# Patient Record
Sex: FEMALE | Race: ASIAN | NOT HISPANIC OR LATINO | Employment: FULL TIME | URBAN - METROPOLITAN AREA
[De-identification: names, ages, dates, MRNs, and addresses within clinical notes are randomized per-mention and may not be internally consistent; named-entity substitution may affect disease eponyms.]

---

## 2017-06-13 ENCOUNTER — OFFICE VISIT (OUTPATIENT)
Dept: FAMILY MEDICINE | Facility: CLINIC | Age: 32
End: 2017-06-13

## 2017-06-13 VITALS
DIASTOLIC BLOOD PRESSURE: 76 MMHG | WEIGHT: 142.4 LBS | BODY MASS INDEX: 25.23 KG/M2 | SYSTOLIC BLOOD PRESSURE: 112 MMHG | TEMPERATURE: 98.4 F | HEART RATE: 83 BPM | HEIGHT: 63 IN | OXYGEN SATURATION: 98 %

## 2017-06-13 DIAGNOSIS — K59.00 CONSTIPATION, UNSPECIFIED CONSTIPATION TYPE: Primary | ICD-10-CM

## 2017-06-13 DIAGNOSIS — G89.29 CHRONIC PAIN OF LEFT KNEE: ICD-10-CM

## 2017-06-13 DIAGNOSIS — M25.562 CHRONIC PAIN OF LEFT KNEE: ICD-10-CM

## 2017-06-13 RX ORDER — ACETAMINOPHEN 500 MG
1000 TABLET ORAL 3 TIMES DAILY PRN
Qty: 100 TABLET | Refills: 0 | Status: SHIPPED | OUTPATIENT
Start: 2017-06-13 | End: 2018-06-20

## 2017-06-13 RX ORDER — POLYETHYLENE GLYCOL 3350 17 G/17G
1 POWDER, FOR SOLUTION ORAL 2 TIMES DAILY
Qty: 510 G | Refills: 1 | Status: SHIPPED | OUTPATIENT
Start: 2017-06-13 | End: 2018-06-20

## 2017-06-13 RX ORDER — IBUPROFEN 800 MG/1
800 TABLET, FILM COATED ORAL EVERY 6 HOURS PRN
Qty: 30 TABLET | Refills: 1 | Status: SHIPPED | OUTPATIENT
Start: 2017-06-13 | End: 2018-06-20
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
## 2017-06-13 NOTE — PATIENT INSTRUCTIONS
We are prescribing you Miralax to help with your abdominal pain.    You can start taking Miralax 2  times daily, you can dissolve the Miralax in 8 oz of fluid until you start having soft easy to pass bowel movements. Then start taking the Miralax once a day.    You also need to drink 8 cups of non caffeine fluid a day    Also Dr. Marrero is sending in Tylenol to your pharmacy to help with the knee pain.    Follow up with Dr. Marrero in one  (1) week.      Referral for ( TEST )  :      Physical Therapy  LOCATION/PLACE/Provider :    [facility] [address] [address]  DATE & TIME :     6-15-17 at 11:00  PHONE :     [phone]  FAX :     [phone]  ADDITIONAL INFORMATION :       Appointment made by clinic staff/:    CYNTHIA

## 2017-06-13 NOTE — MR AVS SNAPSHOT
After Visit Summary   6/13/2017    Sandra Zhang    MRN: [id number]           Patient Information     Date Of Birth          1985        Visit Information        Provider Department      6/13/2017 2:20 PM Milagros Marrero MD [facility]        Today's Diagnoses     Constipation, unspecified constipation type    -  1    Chronic pain of left knee          Care Instructions    We are prescribing you Miralax to help with your abdominal pain.    You can start taking Miralax 2  times daily, you can dissolve the Miralax in 8 oz of fluid until you start having soft easy to pass bowel movements. Then start taking the Miralax once a day.    You also need to drink 8 cups of non caffeine fluid a day    Also Dr. Marrero is sending in Tylenol to your pharmacy to help with the knee pain.    Follow up with Dr. Marrero in one  (1) week.            Follow-ups after your visit        Additional Services     PHYSICAL THERAPY REFERRAL       PT/OT REFERRAL  Sandra Zhang  1985  Phone #: [phone] (home)    needed: No  Language: Sophia    PT/OT  Facility:   [facility], P: [phone], F: [phone] (or other appropriate close to patient and accepted by insurance)    History: chronic left knee pain x years.  Able to ambulate.  Pain over left LCL in clinic today.  No laxity.      Precautions/Contraindications: None    Imaging Studies: None    Surgical Procedure/Test Results: None    Treatment Goals:   Increase: Strength    Prognosis: good    Visits: Up to 10    Evaluate: Evaluate and treat    Plan: per PT                  Who to contact     Please call your clinic at [phone] to:    Ask questions about your health    Make or cancel appointments    Discuss your medicines    Learn about your test results    Speak to your doctor   If you have compliments or concerns about an experience at your clinic, or if you wish to file a complaint, please contact [facility]  "Physicians Patient Relations at [phone] or email us at [email or web address]         Additional Information About Your Visit        [employer] Information     [employer] is an electronic gateway that provides easy, online access to your medical records. With [employer], you can request a clinic appointment, read your test results, renew a prescription or communicate with your care team.     To sign up for [employer] visit the website at [email or web address]   You will be asked to enter the access code listed below, as well as some personal information. Please follow the directions to create your username and password.     Your access code is: [email or web address]  Expires: 2017  2:48 PM     Your access code will  in 90 days. If you need help or a new code, please contact your [facility] or call [phone] for assistance.        Care EveryWhere ID     This is your Care EveryWhere ID. This could be used by other organizations to access your [place] medical records  [id number]        Your Vitals Were     Pulse Temperature Height Pulse Oximetry BMI (Body Mass Index)       83 98.4  F (36.9  C) (Oral) 5' 3.25\" (160.7 cm) 98% 25.03 kg/m2        Blood Pressure from Last 3 Encounters:   17 112/76   14 130/89    Weight from Last 3 Encounters:   17 142 lb 6.4 oz (64.6 kg)   14 127 lb (57.6 kg)              We Performed the Following     PHYSICAL THERAPY REFERRAL          Today's Medication Changes          These changes are accurate as of: 17  2:48 PM.  If you have any questions, ask your nurse or doctor.               Start taking these medicines.        Dose/Directions    acetaminophen 500 MG tablet   Commonly known as:  TYLENOL   Used for:  Constipation, unspecified constipation type, Chronic pain of left knee   Started by:  Milagros Marrero MD        Dose:  1000 mg   Take 2 tablets (1,000 mg) by mouth 3 times daily as needed for mild " pain   Quantity:  100 tablet   Refills:  0       ibuprofen 800 MG tablet   Commonly known as:  ADVIL/MOTRIN   Used for:  Chronic pain of left knee   Started by:  Milagros Marrero MD        Dose:  800 mg   Take 1 tablet (800 mg) by mouth every 6 hours as needed for moderate pain   Quantity:  30 tablet   Refills:  1       polyethylene glycol powder   Commonly known as:  MIRALAX   Used for:  Constipation, unspecified constipation type   Started by:  Milagros Marrero MD        Dose:  1 capful   Take 17 g (1 capful) by mouth 2 times daily Until having soft easy to pass bowel movements then decrease to once daily   Quantity:  510 g   Refills:  1            Where to get your medicines      These medications were sent to [facility]/pharmacy #[id number] - [place], [place] - [address] [facility]  [address], [place] [address]    Hours:  24-hours Phone:  [phone]     acetaminophen 500 MG tablet    ibuprofen 800 MG tablet    polyethylene glycol powder                Primary Care Provider Office Phone # Fax #    Milagros Marrero [phone]       [facility] [address] [place] [address]        Thank you!     Thank you for choosing [facility]  for your care. Our goal is always to provide you with excellent care. Hearing back from our patients is one way we can continue to improve our services. Please take a few minutes to complete the written survey that you may receive in the mail after your visit with us. Thank you!             Your Updated Medication List - Protect others around you: Learn how to safely use, store and throw away your medicines at www.disposemymeds.org.          This list is accurate as of: 6/13/17  2:48 PM.  Always use your most recent med list.                   Brand Name Dispense Instructions for use    acetaminophen 500 MG tablet    TYLENOL    100 tablet    Take 2 tablets (1,000 mg) by mouth 3 times daily as needed for mild pain        ibuprofen 800 MG tablet    ADVIL/MOTRIN    30 tablet    Take 1 tablet (800 mg) by mouth every 6 hours as needed for moderate pain       polyethylene glycol powder    MIRALAX    510 g    Take 17 g (1 capful) by mouth 2 times daily Until having soft easy to pass bowel movements then decrease to once daily

## 2017-09-10 ENCOUNTER — HEALTH MAINTENANCE LETTER (OUTPATIENT)
Age: 32
End: 2017-09-10

## 2018-06-20 ENCOUNTER — AMBULATORY - HEALTHEAST (OUTPATIENT)
Dept: ADMINISTRATIVE | Facility: REHABILITATION | Age: 33
End: 2018-06-20

## 2018-06-20 ENCOUNTER — OFFICE VISIT (OUTPATIENT)
Dept: FAMILY MEDICINE | Facility: CLINIC | Age: 33
End: 2018-06-20
Payer: COMMERCIAL

## 2018-06-20 VITALS
OXYGEN SATURATION: 100 % | RESPIRATION RATE: 18 BRPM | DIASTOLIC BLOOD PRESSURE: 79 MMHG | SYSTOLIC BLOOD PRESSURE: 118 MMHG | HEIGHT: 63 IN | TEMPERATURE: 98.3 F | BODY MASS INDEX: 19.99 KG/M2 | WEIGHT: 112.8 LBS | HEART RATE: 65 BPM

## 2018-06-20 DIAGNOSIS — R07.81 RIB PAIN: ICD-10-CM

## 2018-06-20 DIAGNOSIS — F33.2 SEVERE EPISODE OF RECURRENT MAJOR DEPRESSIVE DISORDER, WITHOUT PSYCHOTIC FEATURES (H): Primary | ICD-10-CM

## 2018-06-20 DIAGNOSIS — R63.4 WEIGHT LOSS: ICD-10-CM

## 2018-06-20 DIAGNOSIS — R07.81 RIB PAIN ON RIGHT SIDE: ICD-10-CM

## 2018-06-20 LAB — TSH SERPL DL<=0.05 MIU/L-ACNC: 1.01 UIU/ML (ref 0.3–5)

## 2018-06-20 ASSESSMENT — ENCOUNTER SYMPTOMS
HEMATURIA: 0
SPUTUM PRODUCTION: 0
CLAUDICATION: 0
HEMOPTYSIS: 0
SEIZURES: 0
SINUS PAIN: 0
COUGH: 1
SHORTNESS OF BREATH: 1
FEVER: 1
ABDOMINAL PAIN: 1
DEPRESSION: 1
WEIGHT LOSS: 1
NAUSEA: 1
DIAPHORESIS: 0
BLURRED VISION: 1
BACK PAIN: 0
DOUBLE VISION: 1
FOCAL WEAKNESS: 0
SPEECH CHANGE: 0
NERVOUS/ANXIOUS: 0
CHILLS: 1
BLOOD IN STOOL: 0
TINGLING: 0
CONSTIPATION: 0
EYE REDNESS: 0
FLANK PAIN: 0
HEADACHES: 1
NECK PAIN: 0
EYE PAIN: 0
PND: 0
DIZZINESS: 0
EYE DISCHARGE: 0
TREMORS: 0
SORE THROAT: 0
WHEEZING: 0
MYALGIAS: 0
STRIDOR: 0
PALPITATIONS: 1
DYSURIA: 1
LOSS OF CONSCIOUSNESS: 0
DIARRHEA: 0
PHOTOPHOBIA: 0
SENSORY CHANGE: 0
INSOMNIA: 1
FREQUENCY: 0
VOMITING: 1
ORTHOPNEA: 1
WEAKNESS: 1
HEARTBURN: 0

## 2018-06-20 ASSESSMENT — LIFESTYLE VARIABLES: SUBSTANCE_ABUSE: 0

## 2018-06-20 NOTE — MR AVS SNAPSHOT
After Visit Summary   6/20/2018    Sandra Zhang    MRN: [id number]           Patient Information     Date Of Birth          1985        Visit Information        Provider Department      6/20/2018 11:00 AM Amol Figueroa MD [facility]        Today's Diagnoses     Severe episode of recurrent major depressive disorder, without psychotic features (H)    -  1    Weight loss        Rib pain on right side          Care Instructions    - You can take 600 ml of ibuprofen as needed every 6 hours   - Please schedule follow-up with Dr. Marrero in 2 weeks      Your medication list is printed, please keep this with you, it is helpful to bring this current list to any other medical appointments, the emergency room or hospital.    If you had lab testing today and your results are reassuring or normal they will be be mailed to you within 7 days.     If the lab tests need quick action we will call you with the results.   The phone number we will call with results is # [phone] (home) . If this is not the best number please call our clinic and change the number.    If you need any refills please call your pharmacy and they will contact us.    If you have any further concerns or wish to schedule another appointment you must call our office during normal business hours  [phone] (8-5:00 M-F)  If you have urgent medical questions that cannot wait  you may also call [phone] at any time of day.  If you have a medical emergency please call [phone].    Thank you for coming to [facility].            Follow-ups after your visit        Additional Services     PHYSICAL THERAPY REFERRAL       PT/OT  Facility:   Where patient prefers     History: Chest/rib pain x2-3 weeks, worse with inspiration     Precautions/Contraindications: None    Imaging Studies: CT    Surgical Procedure/Test Results: None    Treatment Goals:   Decrease: Pain    Prognosis: excellent    Visits: Up to 12    Evaluate: Evaluate  "and treat    Plan: Manual Therapy, Flexibility Exercise and Strength Exercise                  Future tests that were ordered for you today     Open Future Orders        Priority Expected Expires Ordered    PHYSICAL THERAPY REFERRAL Routine 7/4/2018 12/19/2018 6/20/2018            Who to contact     Please call your clinic at [phone] to:    Ask questions about your health    Make or cancel appointments    Discuss your medicines    Learn about your test results    Speak to your doctor            Additional Information About Your Visit        Care EveryWhere ID     This is your Care EveryWhere ID. This could be used by other organizations to access your [place] medical records  [id number]        Your Vitals Were     Pulse Temperature Respirations Height Last Period Pulse Oximetry    65 98.3  F (36.8  C) (Oral) 18 5' 3\" (160 cm) 05/27/2018 100%    BMI (Body Mass Index)                   19.98 kg/m2            Blood Pressure from Last 3 Encounters:   06/20/18 118/79   06/13/17 112/76   04/07/14 130/89    Weight from Last 3 Encounters:   06/20/18 112 lb 12.8 oz (51.2 kg)   06/13/17 142 lb 6.4 oz (64.6 kg)   04/07/14 127 lb (57.6 kg)              We Performed the Following     TSH  Sensitive ([facility])          Today's Medication Changes          These changes are accurate as of 6/20/18 11:50 AM.  If you have any questions, ask your nurse or doctor.               Stop taking these medicines if you haven't already. Please contact your care team if you have questions.     acetaminophen 500 MG tablet   Commonly known as:  TYLENOL   Stopped by:  Amol Figueroa MD           ibuprofen 800 MG tablet   Commonly known as:  ADVIL/MOTRIN   Stopped by:  Amol Figueroa MD           polyethylene glycol powder   Commonly known as:  MIRALAX   Stopped by:  Amol Figueroa MD                    Primary Care Provider Office Phone # Fax #    Milagros Tiffany Marrero [phone]       [facility] [address] [facility] [address]        Equal Access to Services     GABBY ESPINOSA : Hadii aad ku hadjiljoyce Holder, waines lynne, qadigna arcos, suzan gusman. So [facility] [phone].    ATENCIÓN: Si habla español, tiene a underwood disposición servicios gratuitos de asistencia lingüística. Llame al [phone].    We comply with applicable federal civil rights laws and [place] laws. We do not discriminate on the basis of race, color, national origin, age, disability, sex, sexual orientation, or gender identity.            Thank you!     Thank you for choosing [facility]  for your care. Our goal is always to provide you with excellent care. Hearing back from our patients is one way we can continue to improve our services. Please take a few minutes to complete the written survey that you may receive in the mail after your visit with us. Thank you!             Your Updated Medication List - Protect others around you: Learn how to safely use, store and throw away your medicines at www.disposemymeds.org.      Notice  As of 6/20/2018 11:50 AM    You have not been prescribed any medications.

## 2018-06-20 NOTE — PROGRESS NOTES
Assessment and Plan     1. Severe episode of recurrent major depressive disorder, without psychotic features (H)  Significant depression with recent SI. Talked about antidepressant meds at length but she is unwilling to start today. Is willing to start therapy. Warm hand-off given to Dr. Barajas who was then introduced to the patient while the plan was negotiated. Additional coordination occurred after with Dr. Barajas. Crisis pamphlet given to the patient. She is to follow up with either me or her PCP in the next 2-3 weeks and will see Dr. Barajas on Monday. If she becomes amenable, would like to start either Prozac or Zoloft.  - MENTAL HEALTH REFERRAL  -    2. Weight loss  Had a very extensive lab/XR work up in ER 2 weeks ago. Will also check TSH. Continue to monitor.  - TSH  Sensitive ([facility])    3. Rib pain on right side  Was unable to improve rib pain with manipulation. Will refer to PT. OTC Ibuprofen. If not improving, could consider seeing our DO physicians.  - PHYSICAL THERAPY REFERRAL; Future    Overall, I counseled and coordinated care for greater than 50% of the 50 minute face-to-face visit.    Options for treatment and follow-up care were reviewed with the patient and/or guardian. Sandra Zhang and/or guardian engaged in the decision making process and verbalized understanding of the options discussed and agreed with the final plan. I answered all of their questions.    Amol Figueroa III, MD, FAAFP  [facility]  06/20/18 11:13 AM           HPI:       Sandra Zhang is a 32 year old  female  Patient presents with:  Breast Pain: Starts at midsternum and goest to right breast up to the collar bone.  Noticed x3 months.  Pt. went to ER this pas Susie 3 ([facility]) Hurts to breathe, or any other active movements.  Medication Reconciliation: Completed.     Has multiple, significant complaints. Has been having suicidal thoughts off and on for 2 months. Thought of hanging herself in her  closet during her daughter's birthday party. She is a single mom. She has two of her three children (don't have details on the status of the 3rd child). A few weeks ago, she thought about driving her car into the water because she cannot swim. This is not the first time that she has had depressed mood. She was previously told by friends to avoid anti-depressants and is not interested in them today. She repeatedly admits that many of her symptoms are likely attributable to her depressed mood. Denies anxious feelings. It got worse a few weeks ago for a reason that she won't disclose in front of her children. Has SOB with deep breathing. Drinks one day per week. Denies other substance use.    Has been having breast/chest pain that starts in the front and wraps around to the back for 2-3 weeks. Gets worse with movement and deep breathing. Denies trauma.         PMHX:     There is no problem list on file for this patient.      No current outpatient prescriptions on file.       Social History     Social History     Marital status: Single     Spouse name: N/A     Number of children: N/A     Years of education: N/A     Occupational History     Not on file.     Social History Main Topics     Smoking status: Current Every Day Smoker     Smokeless tobacco: Current User      Comment: 2 cig/day     Alcohol use No     Drug use: No     Sexual activity: Not on file     Other Topics Concern     Not on file     Social History Narrative       No Known Allergies    No results found for this or any previous visit (from the past 24 hour(s)).         Review of Systems:     Review of Systems   Constitutional: Positive for chills, fever, malaise/fatigue and weight loss. Negative for diaphoresis.   HENT: Negative for congestion, ear discharge, ear pain, hearing loss, nosebleeds, sinus pain, sore throat and tinnitus.    Eyes: Positive for blurred vision and double vision. Negative for photophobia, pain, discharge and redness.   Respiratory:  "Positive for cough and shortness of breath. Negative for hemoptysis, sputum production, wheezing and stridor.    Cardiovascular: Positive for chest pain, palpitations and orthopnea. Negative for claudication, leg swelling and PND.   Gastrointestinal: Positive for abdominal pain (chronic, unchanged), nausea and vomiting. Negative for blood in stool, constipation, diarrhea, heartburn and melena.   Genitourinary: Positive for dysuria (chronic, x 2-3 years). Negative for flank pain, frequency, hematuria and urgency.   Musculoskeletal: Negative for back pain, myalgias and neck pain.   Skin: Positive for rash. Negative for itching.   Neurological: Positive for weakness and headaches. Negative for dizziness, tingling, tremors, sensory change, speech change, focal weakness, seizures and loss of consciousness.   Psychiatric/Behavioral: Positive for depression and suicidal ideas. Negative for substance abuse. The patient has insomnia. The patient is not nervous/anxious.             Physical Exam:     Vitals:    06/20/18 1056   BP: 118/79   Pulse: 65   Resp: 18   Temp: 98.3  F (36.8  C)   TempSrc: Oral   SpO2: 100%   Weight: 112 lb 12.8 oz (51.2 kg)   Height: 5' 3\" (160 cm)     Body mass index is 19.98 kg/(m^2).    Physical Exam   Constitutional: She is oriented to person, place, and time and well-developed, well-nourished, and in no distress. She appears to not be writhing in pain.  Non-toxic appearance. She does not have a sickly appearance.   Pulmonary/Chest: Breath sounds normal. No accessory muscle usage. No respiratory distress.         Chest wall is not dull to percussion.   Neurological: She is alert and oriented to person, place, and time. Gait normal. GCS score is 15.   Psychiatric: Memory normal. Her mood appears not anxious. Her affect is labile (easily cries). Her affect is not inappropriate. She is not agitated. She exhibits a depressed mood. She expresses suicidal ideation. She expresses no homicidal ideation. " She expresses no suicidal plans and no homicidal plans. She is apathetic. She exhibits ordered thought content. She does not have a flat affect.   Easily comes to tears. Has difficulty committing to a treatment plan.  Insight: good  Judgement: so-so   Nursing note and vitals reviewed.

## 2018-06-20 NOTE — PATIENT INSTRUCTIONS
- You can take 600 ml of ibuprofen as needed every 6 hours   - Please schedule follow-up with Dr. Marrero in 2 weeks      Your medication list is printed, please keep this with you, it is helpful to bring this current list to any other medical appointments, the emergency room or hospital.    If you had lab testing today and your results are reassuring or normal they will be be mailed to you within 7 days.     If the lab tests need quick action we will call you with the results.   The phone number we will call with results is # [phone] (home) . If this is not the best number please call our clinic and change the number.    If you need any refills please call your pharmacy and they will contact us.    If you have any further concerns or wish to schedule another appointment you must call our office during normal business hours  [phone] (8-5:00 M-F)  If you have urgent medical questions that cannot wait  you may also call [phone] at any time of day.  If you have a medical emergency please call [phone].    Thank you for coming to [facility].      Referral for ( TEST )  :      Physical Therapy  LOCATION/PLACE/Provider :    [facility]   DATE & TIME :     7-2-2018 at 7:00am  PHONE :     [phone]  FAX :     [phone]  Appointment made by clinic staff/:    Mariely
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
## 2021-03-11 ENCOUNTER — TELEPHONE (OUTPATIENT)
Dept: FAMILY MEDICINE | Facility: CLINIC | Age: 36
End: 2021-03-11

## 2021-03-11 NOTE — TELEPHONE ENCOUNTER
Sandra Zhang called to schedule an appointment for TB screening.        TB Screening questions  1. Have you had recent contact with a person with active tuberculosis (TB)?  No, continue to next question.  2. Have you ever been treated for tuberculosis (TB) or latent TB before?  No, continue to next question.  3. Has a county worker or another healthcare worker (not your employer) told you to come in to be tested for TB?  No, continue to next question.  4. Have you had a live vaccine (smallpox, flumist, MMR, varicella, oral polio and/or yellow fever) in the last 4 weeks?  No, lab visit scheduled.       Lab visit scheduled for 03/12/2021.    Josemanuel Salter

## 2021-03-12 DIAGNOSIS — Z11.1 SCREENING EXAMINATION FOR PULMONARY TUBERCULOSIS: ICD-10-CM

## 2021-03-12 DIAGNOSIS — Z11.1 SCREENING EXAMINATION FOR PULMONARY TUBERCULOSIS: Primary | ICD-10-CM

## 2021-03-12 PROCEDURE — 36415 COLL VENOUS BLD VENIPUNCTURE: CPT

## 2021-03-12 NOTE — PROGRESS NOTES
Pt came in needing TB test for work. Patient verbally gave permission to please fax  Test results to Julee Braron at  when they are resulted

## 2021-03-17 LAB
QTF ANTIGEN TB1-NIL: -0.01 IU/ML
QTF ANTIGEN TB2-NIL: 0 IU/ML
QTF INTERPRETATION: NORMAL
QTF MITOGEN - NIL: 7.62 IU/ML
QTF NIL: 0.08 IU/ML
QTF RESULT: NEGATIVE

## 2021-03-19 ENCOUNTER — TELEPHONE (OUTPATIENT)
Dept: FAMILY MEDICINE | Facility: CLINIC | Age: 36
End: 2021-03-19

## 2021-03-19 NOTE — TELEPHONE ENCOUNTER
Normal results from 03/12/2021 given to patient. Patient calling to get TB result, told her negative results and verified with J[facility] Medical Assistant. Patient asked for result to be print out she will  today, results printed and left at the  drawer for .     March 19, 2021 at 4:07 PM by Josemanuel Salter  [facility]  [phone]

## 2021-05-30 ENCOUNTER — RECORDS - HEALTHEAST (OUTPATIENT)
Dept: ADMINISTRATIVE | Facility: CLINIC | Age: 36
End: 2021-05-30

## 2022-05-09 ENCOUNTER — LAB REQUISITION (OUTPATIENT)
Dept: LAB | Facility: CLINIC | Age: 37
End: 2022-05-09

## 2022-05-09 LAB
HBV SURFACE AB SERPL IA-ACNC: 0 M[IU]/ML
HBV SURFACE AG SERPL QL IA: NONREACTIVE

## 2022-05-09 PROCEDURE — 86735 MUMPS ANTIBODY: CPT | Performed by: INTERNAL MEDICINE

## 2022-05-09 PROCEDURE — 86787 VARICELLA-ZOSTER ANTIBODY: CPT | Performed by: INTERNAL MEDICINE

## 2022-05-09 PROCEDURE — 86481 TB AG RESPONSE T-CELL SUSP: CPT | Performed by: INTERNAL MEDICINE

## 2022-05-09 PROCEDURE — 86762 RUBELLA ANTIBODY: CPT | Performed by: INTERNAL MEDICINE

## 2022-05-09 PROCEDURE — 87340 HEPATITIS B SURFACE AG IA: CPT | Performed by: INTERNAL MEDICINE

## 2022-05-09 PROCEDURE — 86706 HEP B SURFACE ANTIBODY: CPT | Performed by: INTERNAL MEDICINE

## 2022-05-09 PROCEDURE — 86765 RUBEOLA ANTIBODY: CPT | Performed by: INTERNAL MEDICINE

## 2022-05-10 LAB
MEV IGG SER IA-ACNC: 104 AU/ML
MEV IGG SER IA-ACNC: POSITIVE
MUMPS ANTIBODY IGG INSTRUMENT VALUE: 121 AU/ML
MUV IGG SER QL IA: POSITIVE
QUANTIFERON MITOGEN: 10 IU/ML
QUANTIFERON NIL TUBE: 0.05 IU/ML
QUANTIFERON TB1 TUBE: 0.05 IU/ML
QUANTIFERON TB2 TUBE: 0.06
RUBV IGG SERPL QL IA: 1.23 INDEX
RUBV IGG SERPL QL IA: POSITIVE
VZV IGG SER QL IA: 446.1 INDEX
VZV IGG SER QL IA: POSITIVE

## 2022-05-11 LAB
GAMMA INTERFERON BACKGROUND BLD IA-ACNC: 0.05 IU/ML
M TB IFN-G BLD-IMP: NEGATIVE
M TB IFN-G CD4+ BCKGRND COR BLD-ACNC: 9.95 IU/ML
MITOGEN IGNF BCKGRD COR BLD-ACNC: 0 IU/ML
MITOGEN IGNF BCKGRD COR BLD-ACNC: 0.01 IU/ML

## 2023-08-16 ENCOUNTER — OFFICE VISIT (OUTPATIENT)
Dept: FAMILY MEDICINE | Facility: CLINIC | Age: 38
End: 2023-08-16
Payer: COMMERCIAL

## 2023-08-16 VITALS
TEMPERATURE: 98.1 F | SYSTOLIC BLOOD PRESSURE: 136 MMHG | WEIGHT: 131 LBS | OXYGEN SATURATION: 98 % | HEART RATE: 78 BPM | HEIGHT: 65 IN | BODY MASS INDEX: 21.83 KG/M2 | DIASTOLIC BLOOD PRESSURE: 95 MMHG | RESPIRATION RATE: 16 BRPM

## 2023-08-16 DIAGNOSIS — Z11.3 ROUTINE SCREENING FOR STI (SEXUALLY TRANSMITTED INFECTION): ICD-10-CM

## 2023-08-16 DIAGNOSIS — R30.0 DYSURIA: ICD-10-CM

## 2023-08-16 DIAGNOSIS — N89.8 VAGINAL ITCHING: Primary | ICD-10-CM

## 2023-08-16 DIAGNOSIS — Z11.59 NEED FOR HEPATITIS C SCREENING TEST: ICD-10-CM

## 2023-08-16 DIAGNOSIS — Z12.4 CERVICAL CANCER SCREENING: ICD-10-CM

## 2023-08-16 DIAGNOSIS — Z23 VACCINE FOR VIRAL HEPATITIS: ICD-10-CM

## 2023-08-16 PROBLEM — R63.4 WEIGHT LOSS: Status: RESOLVED | Noted: 2018-06-20 | Resolved: 2023-08-16

## 2023-08-16 LAB
CLUE CELLS: ABNORMAL
HCV AB SERPL QL IA: NONREACTIVE
HIV 1+2 AB+HIV1 P24 AG SERPL QL IA: NONREACTIVE
TRICHOMONAS, WET PREP: ABNORMAL
WBC'S/HIGH POWER FIELD, WET PREP: ABNORMAL
YEAST, WET PREP: ABNORMAL

## 2023-08-16 PROCEDURE — 87210 SMEAR WET MOUNT SALINE/INK: CPT

## 2023-08-16 PROCEDURE — 87624 HPV HI-RISK TYP POOLED RSLT: CPT

## 2023-08-16 PROCEDURE — G0145 SCR C/V CYTO,THINLAYER,RESCR: HCPCS

## 2023-08-16 PROCEDURE — 87389 HIV-1 AG W/HIV-1&-2 AB AG IA: CPT

## 2023-08-16 PROCEDURE — 86803 HEPATITIS C AB TEST: CPT

## 2023-08-16 PROCEDURE — 87491 CHLMYD TRACH DNA AMP PROBE: CPT

## 2023-08-16 PROCEDURE — 99204 OFFICE O/P NEW MOD 45 MIN: CPT | Mod: 25

## 2023-08-16 PROCEDURE — 86780 TREPONEMA PALLIDUM: CPT

## 2023-08-16 PROCEDURE — 36415 COLL VENOUS BLD VENIPUNCTURE: CPT

## 2023-08-16 PROCEDURE — 90471 IMMUNIZATION ADMIN: CPT

## 2023-08-16 PROCEDURE — 90746 HEPB VACCINE 3 DOSE ADULT IM: CPT

## 2023-08-16 PROCEDURE — 87591 N.GONORRHOEAE DNA AMP PROB: CPT

## 2023-08-16 NOTE — PROGRESS NOTES
Assessment & Plan     Vaginal itching  Routine screening for STI (sexually transmitted infection)  Reports intermittent vaginal pruritus since 2011 that has been worsening and more constant over the last few months. Malodorous white/yellow vaginal discharge that occurs about weekly and then resolves after 1-2 days. No new sexual partners in the past 8 years but desires STI screening given symptoms. No concerning findings on  exam today. Differential includes yeast infection, BV, trichomonas, STI, contact dermatitis, etc.  - Wet preparation  - Chlamydia trachomatis/Neisseria gonorrhoeae by PCR - Clinic Collect  - Treponema Abs w Reflex to RPR and Titer  - HIV screen    Dysuria  Also notes burning with urination. May be related to vaginal symptoms as above. Will assess for UTI with UA.  - UA Macroscopic with reflex to Microscopic and Culture    Cervical cancer screening  Last pap in 2009 NILM. Overdue.   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 Has not gotten Hep B vaccine series.   - Hepatitis B vaccine    Follow-up in 1 month for vaginal issues.     Taty Conner MD  [facility]    Nancy Flores is a 37 year old, presenting for the following health issues:  Vaginal Problem (Vaginal issues since 2011, irritation, dryness, itching, spreading to rump)        8/16/2023     7:59 AM   Additional Questions   Roomed by sarah       HPI     Vaginal issues since 2011.  Has had yeast infections in the past that resovled with treatment (this was 10+ years ago).   Started with vaginal itching, then spread to external genitalia.  Now has itchiness inside and outside of the vagina.  Also burning discomfort.  Now seems to be spreading to anal area.  Does have malodorous white and yellow vaginal  "discharge.  The discharge occurs about weekly, resolves after 1-2 days.   Does feel like overall symptoms have gotten a lot worse over the last few months.   Mild pain with intercourse occasionally - usually not bothersome.   Pain with urination.  Not using any OTC treatments or intravaginal products. Does not douche.     Has had same male sexual partner for the past 8 years, monogamous.   No history of STIs.   No fevers, chills, night sweats.   Gets regular periods monthly.   No significant family history of DM.     Review of Systems   Constitutional, HEENT, cardiovascular, pulmonary, gi and gu systems are negative, except as otherwise noted.      Objective    BP (!) 136/95   Pulse 78   Temp 98.1  F (36.7  C) (Oral)   Resp 16   Ht 1.641 m (5' 4.61\")   Wt 59.4 kg (131 lb)   LMP 08/13/2023 (Exact Date)   SpO2 98%   BMI 22.07 kg/m    Body mass index is 22.07 kg/m .  Physical Exam   GENERAL: healthy, alert and no distress  EYES: Eyes grossly normal to inspection, PERRL and conjunctivae and sclerae normal  HENT: ear canals and TM's normal, nose and mouth without ulcers or lesions  NECK: no adenopathy, no asymmetry, masses, or scars and thyroid normal to palpation  RESP: lungs clear to auscultation - no rales, rhonchi or wheezes  BREAST: normal without masses, tenderness or nipple discharge and no palpable axillary masses or adenopathy  CV: regular rate and rhythm, normal S1 S2, no S3 or S4, no murmur, click or rub, no peripheral edema and peripheral pulses strong  ABDOMEN: soft, nontender, no hepatosplenomegaly, no masses and bowel sounds normal   (female): normal female external genitalia, normal urethral meatus, vaginal mucosa pink, moist, well rugated. Cervix with some yellow discoloration at the 12:00 position, bleeds easily to touch.   MS: no gross musculoskeletal defects noted, no edema  SKIN: no suspicious lesions or rashes  NEURO: Normal strength and tone, mentation intact and speech normal  PSYCH: " mentation appears normal, affect normal/bright

## 2023-08-16 NOTE — PROGRESS NOTES
Preceptor Attestation:   Patient seen, evaluated and discussed with the resident. I have verified the content of the note, which accurately reflects my assessment of the patient and the plan of care.    Supervising Physician:Carol Botello MD    [facility]

## 2023-08-17 LAB
C TRACH DNA SPEC QL PROBE+SIG AMP: NEGATIVE
N GONORRHOEA DNA SPEC QL NAA+PROBE: NEGATIVE

## 2023-08-18 LAB — T PALLIDUM AB SER QL: NONREACTIVE

## 2023-08-19 LAB
BKR LAB AP GYN ADEQUACY: NORMAL
BKR LAB AP GYN INTERPRETATION: NORMAL
BKR LAB AP HPV REFLEX: NORMAL
BKR LAB AP LMP: NORMAL
BKR LAB AP PREVIOUS ABNORMAL: NORMAL
PATH REPORT.COMMENTS IMP SPEC: NORMAL
PATH REPORT.COMMENTS IMP SPEC: NORMAL
PATH REPORT.RELEVANT HX SPEC: NORMAL

## 2023-08-22 LAB
HUMAN PAPILLOMA VIRUS 16 DNA: NEGATIVE
HUMAN PAPILLOMA VIRUS 18 DNA: NEGATIVE
HUMAN PAPILLOMA VIRUS FINAL DIAGNOSIS: NORMAL
HUMAN PAPILLOMA VIRUS OTHER HR: NEGATIVE

## 2023-08-28 ENCOUNTER — LAB REQUISITION (OUTPATIENT)
Dept: LAB | Facility: CLINIC | Age: 38
End: 2023-08-28

## 2023-08-28 DIAGNOSIS — M10.9 GOUT, UNSPECIFIED: ICD-10-CM

## 2023-08-28 LAB — URATE SERPL-MCNC: 9.5 MG/DL (ref 2.4–5.7)

## 2023-08-28 PROCEDURE — 84550 ASSAY OF BLOOD/URIC ACID: CPT | Performed by: PHYSICIAN ASSISTANT
